# Patient Record
Sex: MALE | Race: OTHER | Employment: OTHER | URBAN - METROPOLITAN AREA
[De-identification: names, ages, dates, MRNs, and addresses within clinical notes are randomized per-mention and may not be internally consistent; named-entity substitution may affect disease eponyms.]

---

## 2017-03-06 ENCOUNTER — HOSPITAL ENCOUNTER (EMERGENCY)
Age: 22
Discharge: HOME | End: 2017-03-06
Attending: EMERGENCY MEDICINE
Payer: SELF-PAY

## 2017-03-06 VITALS
RESPIRATION RATE: 14 BRPM | HEART RATE: 78 BPM | OXYGEN SATURATION: 99 % | TEMPERATURE: 98.2 F | SYSTOLIC BLOOD PRESSURE: 146 MMHG | DIASTOLIC BLOOD PRESSURE: 81 MMHG

## 2017-03-06 DIAGNOSIS — K04.7 DENTAL ABSCESS: Primary | ICD-10-CM

## 2017-03-06 PROCEDURE — 99283 EMERGENCY DEPT VISIT LOW MDM: CPT | Performed by: NURSE PRACTITIONER

## 2017-03-06 RX ORDER — AMOXICILLIN 500 MG/1
500 TABLET, FILM COATED ORAL 3 TIMES DAILY
Qty: 21 TAB | Refills: 0 | Status: SHIPPED | OUTPATIENT
Start: 2017-03-06

## 2017-03-06 NOTE — ED NOTES
Discharge instructions, list of dental clinics, and prescriptions provided and explained to the pt. Opportunity for questions provided. A signed copy of AVS was placed in chart.  present.

## 2017-03-06 NOTE — PROGRESS NOTES
present during assessment and discharged. Thank you for this referral,      Enoc Novoa, 20 [facility]  /Patient [address]  [address], [facility]    [phone]

## 2017-03-06 NOTE — PROGRESS NOTES
present during registration and triage.     [address] [facility]  [phone]

## 2017-03-06 NOTE — DISCHARGE INSTRUCTIONS
Absceso dental: Instrucciones de cuidado - [ Abscessed Tooth: Care Instructions ]  Instrucciones de cuidado    Un absceso dental es un diente que tiene bubba bolsa de pus en los tejidos que lo rodean. El pus se forma cuando el cuerpo trata de combatir bubba infección causada por bacterias. Si el pus no puede drenar, se forma un absceso. Un absceso dental puede causar enrojecimiento e hinchazón de las encías y dolor pulsante, especialmente al [employer]. Usted podría tener un mal sabor en la boca y [place], y la mandíbula podría hincharse. El daño en un diente, caries dentales sin tratar o la enfermedad de las encías pueden causar un absceso dental.  Un absceso dental debe ser tratado por un profesional dental de inmediato. Si no se trata, la infección podría extenderse a otras partes del cuerpo. El dentista le recetará antibióticos para detener la infección. También podría hacer un orificio en el diente o cortar (con lanceta) en el interior de la boca para abrir el absceso de manera que la infección pueda drenar y así aliviar el dolor. Usted podría necesitar un tratamiento de conducto radicular, que trata de salvar el diente al extraer la pulpa infectada y reemplazarla con un medicamento curativo y/o un relleno. Si estos tratamientos no resultan efectivos, podrían extraerle el diente. La atención de seguimiento es bubba parte clave de perdue tratamiento y seguridad. Asegúrese de hacer y acudir a todas las citas, y llame a perdue médico si está teniendo problemas. También es bubba buena idea saber los resultados de los exámenes y mantener bubba lista de los medicamentos que liudmila. ¿Cómo puede cuidarse en el hogar? · Para reducir el dolor y la hinchazón de la lea y la Joanna, aplíquese hielo o bubba compresa fría en la parte externa de la mejilla candida 10 a 20 minutos cada vez. Póngase un paño mcclain entre el hielo y la piel. · [employer] analgésicos (medicamentos para el dolor) exactamente según las indicaciones.   ¨ Si el médico le recetó un analgésico, tómelo según las indicaciones. ¨ Si no está tomando un analgésico recetado, pregúntele a perdue médico si puede huang lennie de [employer]. · [address] No deje de tomarlos por el hecho de sentirse mejor. Debe huang todos los antibióticos hasta terminarlos. Para prevenir un absceso dental  · Cepíllese los dientes y use hilo dental todos los días, y hágase revisiones dentales periódicas. · Consuma bubba dieta saludable y evite alimentos y bebidas azucarados. · No fume ni use tabaco para mascar. El uso de tabaco retrasa la sanación. También aumenta el riesgo de enfermedad de las encías y de cáncer de boca y garganta. Si necesita ayuda para dejar de fumar, hable con predue médico sobre programas y medicamentos para dejar de fumar. Pueden aumentar paloma probabilidades de dejar el hábito para siempre. ¿Cuándo debe pedir ayuda? Llame al 911 en cualquier momento que considere que necesita atención de [place]. Por ejemplo, llame si:  · Tiene dificultades para respirar. Llame a perdue médico ahora mismo o busque atención médica inmediata si:  · Siente mareos o aturdimiento, o que está a punto de [place]. · Tiene fiebre nueva o más jose. · Curly Pamela, enrojecimiento o dolor que se propaga o Reeda Apa. · Tiene pus que supura de la dinah del diente. · Tiene dolor de oído o dolor detrás de la [place]. · Tiene fiebre junto con rigidez en el annalise o un dolor de sandy intenso. · Tiene sensibilidad a la nicolasa, o se siente muy somnoliento (con sueño) o confuso. Preste especial atención a los cambios en perdue dulce maria y asegúrese de comunicarse con perdue médico si:  · No mejora bakari se esperaba. ¿Dónde puede encontrar más información en inglés? Twila Harper a http://osmin-rajan.info/. William Parks [id number] en la búsqueda para aprender más acerca de \"Absceso dental: Instrucciones de cuidado - [ Abscessed Tooth: Care Instructions ]. \"  Revisado: 9 agosto, 2016  Versión del contenido: 11.1  © 7882-0797 [employer]. Las instrucciones de cuidado fueron adaptadas bajo licencia por [employer] (which disclaims liability or warranty for this information). Si usted tiene [place] afección médica o sobre estas instrucciones, siempre pregunte a perdue profesional de dulce maria. [employer] Inc[employer] niega toda garantía o responsabilidad por perdue uso de esta información.

## 2017-03-06 NOTE — ED PROVIDER NOTES
HPI Comments: Patient states he has had 2 cavities for \"a while\". He states he woke this morning with swelling and pain to his left lower jaw. He denies fever and drainage. Patient is a 22 y.o. male presenting with dental problem. The history is provided by the patient. Dental Pain    This is a new problem. The current episode started more than 2 days ago. The problem occurs constantly. The problem has been gradually worsening. The pain is located in the right lower mouth. The quality of the pain is constant. The pain is at a severity of 8/10. The pain is mild. Associated symptoms include swelling and gum redness. There was no vomiting, no nausea, no fever, no chest pain, no shortness of breath, no headaches and no drainage. The patient has no cardiac history. History reviewed. No pertinent past medical history. History reviewed. No pertinent surgical history. History reviewed. No pertinent family history. Social History     Social History    Marital status: SINGLE     Spouse name: N/A    Number of children: N/A    Years of education: N/A     Occupational History    Not on file. Social History Main Topics    Smoking status: Never Smoker    Smokeless tobacco: Not on file    Alcohol use No    Drug use: No    Sexual activity: Not on file     Other Topics Concern    Not on file     Social History Narrative    No narrative on file         ALLERGIES: Review of patient's allergies indicates no known allergies. Review of Systems   Constitutional: Negative for fatigue and fever. HENT: Positive for dental problem and facial swelling. Negative for drooling and ear pain. Musculoskeletal: Positive for myalgias. Vitals:    03/06/17 0828 03/06/17 0849 03/06/17 0922   BP: (!) 154/103  146/81   Pulse: 89  78   Resp: 16  14   Temp:   98.2 °F (36.8 °C)   SpO2: 98% 99% 99%            Physical Exam   Constitutional: He is oriented to person, place, and time.  He appears well-developed and well-nourished. No distress. HENT:   Head:       Right Ear: Tympanic membrane normal.   Left Ear: Tympanic membrane normal.   Nose: Nose normal.   Mouth/Throat: Uvula is midline, oropharynx is clear and moist and mucous membranes are normal. He does not have dentures. No oral lesions. No trismus in the jaw. Abnormal dentition. Dental abscesses and dental caries present. No uvula swelling or lacerations. Cardiovascular: Normal rate, regular rhythm and normal heart sounds. No murmur heard. Pulmonary/Chest: Effort normal and breath sounds normal. No respiratory distress. He has no wheezes. Musculoskeletal: Normal range of motion. Neurological: He is alert and oriented to person, place, and time. Skin: Skin is warm and dry. He is not diaphoretic. Nursing note and vitals reviewed. MDM  Number of Diagnoses or Management Options  Dental abscess: new and requires workup  Diagnosis management comments: Patient given prescription for amoxicillin. Patient given list of dentist to follow up with. Patient currently denies pain. Patient discharged to follow up as needed.      Patient Progress  Patient progress: stable    ED Course       Procedures

## 2017-03-06 NOTE — ED TRIAGE NOTES
Patient complains of right lower dental pain. States swelling started yesterday, but he has had a cavity for a long time.